# Patient Record
(demographics unavailable — no encounter records)

---

## 2024-12-04 NOTE — HISTORY OF PRESENT ILLNESS
[Mother] : mother [Father] : father [Formula ___ oz/feed] : [unfilled] oz of formula per feed [Normal] : Normal [In Crib] : sleeps in crib [Wakes up at night] : wakes up at night [Sippy Cup use] : sippy cup use [Brushing teeth] : brushing teeth [Toothpaste] : Primary Fluoride Source: Toothpaste [Rear facing car seat in  back seat] : Rear facing car seat in  back seat [Carbon Monoxide Detectors] : Carbon monoxide detectors [Smoke Detectors] : Smoke detectors [de-identified] : Had rash/hives around mouth after eggs and salmon [de-identified] : Marbles occasionally.

## 2024-12-04 NOTE — DEVELOPMENTAL MILESTONES
[Uses basic gestures] : uses basic gestures [Says "Norris" or "Mama"] : does not say "Norris" or "Mama" nonspecifically [Sits well without support] : sits well without support [Transitions between sitting and lying] : transitions between sitting and lying [Crawls] : crawls [Picks up small objects with 3 fingers] : picks up small objects with 3 fingers and thumb [[place] objects together] : bangs objects together [FreeTextEntry1] : Pulls to stand.

## 2025-02-12 NOTE — DEVELOPMENTAL MILESTONES
[Normal Development] : Normal Development [None] : none [Looks for hidden objects] : looks for hidden objects [Imitates new gestures] : imitates new gestures [Says "Dad" or "Mom" with meaning] : says "Dad" or "Mom" with meaning [Follows a verbal command that] : follows a verbal command that includes a gesture [Takes first independent] : takes first independent steps

## 2025-02-14 NOTE — DISCUSSION/SUMMARY
[FreeTextEntry1] : Hx of MPA - Tolerated yogurt, yogurt melts and mac n cheese recently - Trial Whole milk, limit to Max 24 oz/day  WC - Appropriate growth & Development for age  - Continue table foods, 3 meals with 2-3 snacks per day. Incorporate up to 6 oz of flourinated water daily in a sippy cup. Brush teeth twice a day with soft toothbrush. - continue safe sleep practice, encourage separate sleeping space in own crib with no loose or soft bedding. Lower crib mattress - Overall try to avoid screen time but limit screen time to max 1-2 hours per day.  Read aloud to baby. - vaccines given today: MMR #1, VZV #1 - Hb, Lead, Amblyopia screen normal. - Return for 15 mo [facility]

## 2025-02-14 NOTE — HISTORY OF PRESENT ILLNESS
[Parents] : parents [Formula ___ oz/feed] : [unfilled] oz of formula per feed [Normal] : Normal [Brushing teeth] : Brushing teeth [Toothpaste] : Primary Fluoride Source: Toothpaste [Car seat in back seat] : Car seat in back seat [Smoke Detectors] : Smoke detectors [Carbon Monoxide Detectors] : Carbon monoxide detectors [Up to date] : Up to date [de-identified] : limited meat , loves fruits and most vegetables. drinking water. Aliimentum 35 oz/day...Has had yogurt melts, greek yogurt, mac n cheese [FreeTextEntry9] : HOME

## 2025-02-14 NOTE — HISTORY OF PRESENT ILLNESS
[Parents] : parents [Formula ___ oz/feed] : [unfilled] oz of formula per feed [Normal] : Normal [Brushing teeth] : Brushing teeth [Toothpaste] : Primary Fluoride Source: Toothpaste [Car seat in back seat] : Car seat in back seat [Smoke Detectors] : Smoke detectors [Carbon Monoxide Detectors] : Carbon monoxide detectors [Up to date] : Up to date [de-identified] : limited meat , loves fruits and most vegetables. drinking water. Aliimentum 35 oz/day...Has had yogurt melts, greek yogurt, mac n cheese [FreeTextEntry9] : HOME

## 2025-02-14 NOTE — DISCUSSION/SUMMARY
[FreeTextEntry1] : Hx of MPA - Tolerated yogurt, yogurt melts and mac n cheese recently - Trial Whole milk, limit to Max 24 oz/day  WC - Appropriate growth & Development for age  - Continue table foods, 3 meals with 2-3 snacks per day. Incorporate up to 6 oz of flourinated water daily in a sippy cup. Brush teeth twice a day with soft toothbrush. - continue safe sleep practice, encourage separate sleeping space in own crib with no loose or soft bedding. Lower crib mattress - Overall try to avoid screen time but limit screen time to max 1-2 hours per day.  Read aloud to baby. - vaccines given today: MMR #1, VZV #1 - Hb, Lead, Amblyopia screen normal. - Return for 15 mo [facility]

## 2025-02-14 NOTE — PHYSICAL EXAM
[Alert] : alert [Normocephalic] : normocephalic [Closed Anterior [place]] : closed anterior fontanelle [Red Reflex] : red reflex bilateral [PERRL] : PERRL [Normally Placed Ears] : normally placed ears [Auricles Well Formed] : auricles well formed [Clear Tympanic membranes] : clear tympanic membranes [Light reflex present] : light reflex present [Bony landmarks visible] : bony landmarks visible [Discharge] : no discharge [Nares Patent] : nares patent [Palate Intact] : palate intact [Uvula Midline] : uvula midline [Tooth Eruption] : tooth eruption [Supple, full passive range of motion] : supple, full passive range of motion [Palpable Masses] : no palpable masses [Symmetric Chest Rise] : symmetric chest rise [Clear to Auscultation Bilaterally] : clear to auscultation bilaterally [Regular Rate and Rhythm] : regular rate and rhythm [S1, S2 present] : S1, S2 present [Murmurs] : no murmurs [+2 Femoral Pulses] : (+) 2 femoral pulses [Soft] : soft [Tender] : nontender [Distended] : nondistended [Bowel Sounds] : normoactive bowel sounds [Hepatomegaly] : no hepatomegaly [Splenomegaly] : no splenomegaly [Central Urethral Opening] : central urethral opening [Testicles Descended] : testicles descended bilaterally [No Abnormal Lymph Nodes Palpated] : no abnormal lymph nodes palpated [Symmetric Abduction and Rotation of Hips] : symmetric abduction and rotation of hips [Allis Sign] : negative Allis sign [Straight] : straight [Cranial Nerves Grossly Intact] : cranial nerves grossly intact [Rash or Lesions] : no rash/lesions

## 2025-02-14 NOTE — PHYSICAL EXAM
[Alert] : alert [Normocephalic] : normocephalic [Closed Anterior [place]] : closed anterior fontanelle [Red Reflex] : red reflex bilateral [PERRL] : PERRL [Normally Placed Ears] : normally placed ears [Auricles Well Formed] : auricles well formed [Clear Tympanic membranes] : clear tympanic membranes [Light reflex present] : light reflex present [Bony landmarks visible] : bony landmarks visible [Discharge] : no discharge [Nares Patent] : nares patent [Palate Intact] : palate intact [Uvula Midline] : uvula midline [Tooth Eruption] : tooth eruption [Supple, full passive range of motion] : supple, full passive range of motion [Palpable Masses] : no palpable masses [Symmetric Chest Rise] : symmetric chest rise [Clear to Auscultation Bilaterally] : clear to auscultation bilaterally [Regular Rate and Rhythm] : regular rate and rhythm [S1, S2 present] : S1, S2 present [Murmurs] : no murmurs [+2 Femoral Pulses] : (+) 2 femoral pulses [Soft] : soft [Tender] : nontender [Distended] : nondistended [Bowel Sounds] : normoactive bowel sounds [Hepatomegaly] : no hepatomegaly [Splenomegaly] : no splenomegaly [Central Urethral Opening] : central urethral opening [Testicles Descended] : testicles descended bilaterally [No Abnormal Lymph Nodes Palpated] : no abnormal lymph nodes palpated [Symmetric Abduction and Rotation of Hips] : symmetric abduction and rotation of hips [Allis Sign] : negative Allis sign [Straight] : straight [Cranial Nerves Grossly Intact] : cranial nerves grossly intact [Rash or Lesions] : no rash/lesions

## 2025-05-28 NOTE — DEVELOPMENTAL MILESTONES
[Normal Development] : Normal Development [None] : none [Points to ask for something] : points to ask for something or to get help [Speaks in sounds that seem like] : speaks in sounds that seem like an unknown language [Follows directions that do not] : follows direction that do not include a gesture [Looks when parent says,] : looks when parent says, "Where is...?" [Begins to run] : begins to run

## 2025-05-30 NOTE — PHYSICAL EXAM
[Alert] : alert [No Acute Distress] : no acute distress [Normocephalic] : normocephalic [Anterior [place] Closed] : anterior fontanelle closed [Red Reflex Bilateral] : red reflex bilateral [PERRL] : PERRL [Normally Placed Ears] : normally placed ears [Auricles Well Formed] : auricles well formed [Clear Tympanic membranes with present light reflex and bony landmarks] : clear tympanic membranes with present light reflex and bony landmarks [No Discharge] : no discharge [Nares Patent] : nares patent [Palate Intact] : palate intact [Uvula Midline] : uvula midline [Tooth Eruption] : tooth eruption  [Supple, full passive range of motion] : supple, full passive range of motion [No Palpable Masses] : no palpable masses [Symmetric Chest Rise] : symmetric chest rise [Clear to Auscultation Bilaterally] : clear to auscultation bilaterally [Regular Rate and Rhythm] : regular rate and rhythm [S1, S2 present] : S1, S2 present [No Murmurs] : no murmurs [+2 Femoral Pulses] : +2 femoral pulses [Soft] : soft [NonTender] : non tender [Non Distended] : non distended [Normoactive Bowel Sounds] : normoactive bowel sounds [No Hepatomegaly] : no hepatomegaly [No Splenomegaly] : no splenomegaly [Central Urethral Opening] : central urethral opening [Testicles Descended Bilaterally] : testicles descended bilaterally [Patent] : patent [Normally Placed] : normally placed [No Abnormal Lymph Nodes Palpated] : no abnormal lymph nodes palpated [No Clavicular Crepitus] : no clavicular crepitus [Negative Rosenbaum-Ortalani] : negative Rosenbaum-Ortalani [Symmetric Buttocks Creases] : symmetric buttocks creases [No Spinal Dimple] : no spinal dimple [NoTuft of Hair] : no tuft of hair [Cranial Nerves Grossly Intact] : cranial nerves grossly intact [No Rash or Lesions] : no rash or lesions

## 2025-05-30 NOTE — DISCUSSION/SUMMARY
[Communication and Social Development] : communication and social development [Sleep Routines and Issues] : sleep routines and issues [Temper Tantrums and Discipline] : temper tantrums and discipline [Healthy Teeth] : healthy teeth [Safety] : safety [Mother] : mother [Father] : father [] : The components of the vaccine(s) to be administered today are listed in the plan of care. The disease(s) for which the vaccine(s) are intended to prevent and the risks have been discussed with the caretaker.  The risks are also included in the appropriate vaccination information statements which have been provided to the patient's caregiver.  The caregiver has given consent to vaccinate. [FreeTextEntry1] : 15 month old boy here for [facility]  travel guidance - going to [facility] this summer - rec Hep A - given today  [facility] - Appropriate growth & Development for age - Continue whole cow's milk. Continue table foods, 3 meals with 2-3 snacks per day. - Incorporate water daily in a sippy cup. Brush teeth twice a day with soft toothbrush. - When in car, keep child in rear-facing car seats until age 2, or until  the maximum height and weight for seat is reached. - Put baby to sleep in own crib. Help baby to maintain consistent daily routines and sleep schedule. - Ensure home is safe since baby is increasingly mobile. - Read aloud to baby. - Vaccines: Prevnar & HiB - Return for 18 month old [facility]

## 2025-05-30 NOTE — DISCUSSION/SUMMARY
[Communication and Social Development] : communication and social development [Sleep Routines and Issues] : sleep routines and issues [Temper Tantrums and Discipline] : temper tantrums and discipline [Healthy Teeth] : healthy teeth [Safety] : safety [Mother] : mother [Father] : father [] : The components of the vaccine(s) to be administered today are listed in the plan of care. The disease(s) for which the vaccine(s) are intended to prevent and the risks have been discussed with the caretaker.  The risks are also included in the appropriate vaccination information statements which have been provided to the patient's caregiver.  The caregiver has given consent to vaccinate. [FreeTextEntry1] : 15 month old boy here for [facility]  travel guidance - going to [facility] this summer - rec Hep A - given today  [facility] - Appropriate growth & Development for age - Continue whole cow's milk. Continue table foods, 3 meals with 2-3 snacks per day. - Incorporate water daily in a sippy cup. Brush teeth twice a day with soft toothbrush. - When in car, keep child in rear-facing car seats until age 2, or until  the maximum height and weight for seat is reached. - Put baby to sleep in own crib. Help baby to maintain consistent daily routines and sleep schedule. - Ensure home is safe since baby is increasingly mobile. - Read aloud to baby. - Vaccines: Prevnar & HiB - Return for 18 month old [facility]

## 2025-05-30 NOTE — HISTORY OF PRESENT ILLNESS
[Mother] : mother [Father] : father [Cow's milk (Ounces per day ___)] : consumes [unfilled] oz of cow's milk per day [Fruit] : fruit [Vegetables] : vegetables [Table food] : table food [Normal] : Normal [Wakes up at night] : Wakes up at night [Brushing teeth] : Brushing teeth [Car seat in back seat] : Car seat in back seat [Carbon Monoxide Detectors] : Carbon monoxide detectors [Smoke Detectors] : Smoke detectors [NO] : No [de-identified] : Loves fruits, some meat - prefers ground meat.  [FreeTextEntry9] : HOME

## 2025-05-30 NOTE — PHYSICAL EXAM
[Alert] : alert [No Acute Distress] : no acute distress [Normocephalic] : normocephalic [Anterior [place] Closed] : anterior fontanelle closed [Red Reflex Bilateral] : red reflex bilateral [PERRL] : PERRL [Normally Placed Ears] : normally placed ears [Auricles Well Formed] : auricles well formed [Clear Tympanic membranes with present light reflex and bony landmarks] : clear tympanic membranes with present light reflex and bony landmarks [No Discharge] : no discharge [Nares Patent] : nares patent [Palate Intact] : palate intact [Uvula Midline] : uvula midline [Tooth Eruption] : tooth eruption  [Supple, full passive range of motion] : supple, full passive range of motion [No Palpable Masses] : no palpable masses [Symmetric Chest Rise] : symmetric chest rise [Clear to Auscultation Bilaterally] : clear to auscultation bilaterally [Regular Rate and Rhythm] : regular rate and rhythm [S1, S2 present] : S1, S2 present [No Murmurs] : no murmurs [+2 Femoral Pulses] : +2 femoral pulses [Soft] : soft [NonTender] : non tender [Non Distended] : non distended [Normoactive Bowel Sounds] : normoactive bowel sounds [No Hepatomegaly] : no hepatomegaly [No Splenomegaly] : no splenomegaly [Central Urethral Opening] : central urethral opening [Testicles Descended Bilaterally] : testicles descended bilaterally [Patent] : patent [Normally Placed] : normally placed [No Abnormal Lymph Nodes Palpated] : no abnormal lymph nodes palpated [No Clavicular Crepitus] : no clavicular crepitus [Negative Rosenbaum-Ortalani] : negative Rosenbaum-Ortalani [Symmetric Buttocks Creases] : symmetric buttocks creases [No Spinal Dimple] : no spinal dimple [NoTuft of Hair] : no tuft of hair [Cranial Nerves Grossly Intact] : cranial nerves grossly intact [No Rash or Lesions] : no rash or lesions

## 2025-05-30 NOTE — HISTORY OF PRESENT ILLNESS
[Mother] : mother [Father] : father [Cow's milk (Ounces per day ___)] : consumes [unfilled] oz of cow's milk per day [Fruit] : fruit [Vegetables] : vegetables [Table food] : table food [Normal] : Normal [Wakes up at night] : Wakes up at night [Brushing teeth] : Brushing teeth [Car seat in back seat] : Car seat in back seat [Carbon Monoxide Detectors] : Carbon monoxide detectors [Smoke Detectors] : Smoke detectors [NO] : No [de-identified] : Loves fruits, some meat - prefers ground meat.  [FreeTextEntry9] : HOME

## 2025-07-21 NOTE — HISTORY OF PRESENT ILLNESS
[de-identified] : COUGHING AND CONGESTION [FreeTextEntry6] : 17m M present with cough and congestion x4 days. Denies any fever, SOB or change in appetite.

## 2025-07-21 NOTE — DISCUSSION/SUMMARY
[FreeTextEntry1] : 17m M w/ presentation consistent with acute URI  Plan: 1. Supportive care with Tylenol/Motrin PRN, increased fluids, keeping head elevated and rest 2. Monitor and return with any new or worsening symptoms.

## 2025-07-21 NOTE — PHYSICAL EXAM
[NL] : warm, clear [Cerumen in canal] : cerumen in canal [Right] : (right) [Clear] : left tympanic membrane clear

## 2025-07-21 NOTE — REVIEW OF SYSTEMS
[Cough] : cough [Congestion] : congestion [Negative] : Genitourinary [Fever] : no fever [Ear Tugging] : no ear tugging